# Patient Record
Sex: FEMALE | Race: WHITE
[De-identification: names, ages, dates, MRNs, and addresses within clinical notes are randomized per-mention and may not be internally consistent; named-entity substitution may affect disease eponyms.]

---

## 2022-02-16 ENCOUNTER — APPOINTMENT (OUTPATIENT)
Dept: HEART AND VASCULAR | Facility: CLINIC | Age: 52
End: 2022-02-16
Payer: COMMERCIAL

## 2022-02-16 ENCOUNTER — NON-APPOINTMENT (OUTPATIENT)
Age: 52
End: 2022-02-16

## 2022-02-16 ENCOUNTER — TRANSCRIPTION ENCOUNTER (OUTPATIENT)
Age: 52
End: 2022-02-16

## 2022-02-16 VITALS
DIASTOLIC BLOOD PRESSURE: 90 MMHG | WEIGHT: 200 LBS | BODY MASS INDEX: 30.31 KG/M2 | HEART RATE: 93 BPM | HEIGHT: 68 IN | SYSTOLIC BLOOD PRESSURE: 155 MMHG

## 2022-02-16 PROCEDURE — 93000 ELECTROCARDIOGRAM COMPLETE: CPT

## 2022-02-16 PROCEDURE — 99204 OFFICE O/P NEW MOD 45 MIN: CPT

## 2022-02-16 PROCEDURE — ZZZZZ: CPT

## 2022-02-16 PROCEDURE — 93306 TTE W/DOPPLER COMPLETE: CPT

## 2022-02-16 NOTE — REVIEW OF SYSTEMS
[Headache] : headache [Weight Gain (___ Lbs)] : [unfilled] ~Ulb weight gain [Dyspnea on exertion] : dyspnea during exertion [Palpitations] : palpitations [Negative] : Musculoskeletal

## 2022-02-21 NOTE — PHYSICAL EXAM
[Normal] : normal [Normal S1] : normal S1 [Rhythm Regular] : regular [Normal S2] : normal S2 [II] : a grade 2 [S3] : no S3 [S4] : no S4

## 2022-02-21 NOTE — ASSESSMENT
[FreeTextEntry1] : Impression \par HTN with arrhythmia\par will start w BB toprol 25 xl qd \par Echo Lvef 55% 1-2 MR 1+ ,AI \par Ekg No LVH no delta wave \par Will rec Holter or MCT if arrhythmia recur

## 2022-02-21 NOTE — HISTORY OF PRESENT ILLNESS
[FreeTextEntry1] : Patient is a 51-year-old perimenopausal female who has a prior history of gestational toxemia and gestational diabetes who in the past has had fluctuating blood pressures.  Patient was seen by her internist recently for an episode of sustained palpitations lasting about 20 minutes associated with moderate degree of shortness of breath.  She has noted periodic headaches which have increased in intensity over the last number of weeks and is here for cardiac evaluation.  Her last menstrual period was approximately 1 year ago

## 2022-03-02 ENCOUNTER — APPOINTMENT (OUTPATIENT)
Dept: HEART AND VASCULAR | Facility: CLINIC | Age: 52
End: 2022-03-02
Payer: COMMERCIAL

## 2022-03-02 VITALS
SYSTOLIC BLOOD PRESSURE: 130 MMHG | HEIGHT: 68 IN | WEIGHT: 200 LBS | DIASTOLIC BLOOD PRESSURE: 90 MMHG | HEART RATE: 94 BPM | BODY MASS INDEX: 30.31 KG/M2

## 2022-03-02 PROCEDURE — 93015 CV STRESS TEST SUPVJ I&R: CPT

## 2022-03-28 ENCOUNTER — NON-APPOINTMENT (OUTPATIENT)
Age: 52
End: 2022-03-28

## 2022-03-28 ENCOUNTER — APPOINTMENT (OUTPATIENT)
Dept: HEART AND VASCULAR | Facility: CLINIC | Age: 52
End: 2022-03-28
Payer: COMMERCIAL

## 2022-03-28 VITALS
SYSTOLIC BLOOD PRESSURE: 138 MMHG | DIASTOLIC BLOOD PRESSURE: 84 MMHG | HEART RATE: 59 BPM | WEIGHT: 210 LBS | BODY MASS INDEX: 31.83 KG/M2 | HEIGHT: 68 IN

## 2022-03-28 PROCEDURE — 99213 OFFICE O/P EST LOW 20 MIN: CPT

## 2022-03-28 NOTE — HISTORY OF PRESENT ILLNESS
[FreeTextEntry1] : Gained 10 lbs on BB\par less arrhythmia and lower BP noted \par ETT neg for ischemic abnl at 9 min

## 2022-03-28 NOTE — REVIEW OF SYSTEMS
[Palpitations] : palpitations [Negative] : Gastrointestinal [Headache] : no headache [Feeling Fatigued] : not feeling fatigued [Earache] : no earache [Sore Throat] : no sore throat

## 2022-06-13 ENCOUNTER — APPOINTMENT (OUTPATIENT)
Dept: HEART AND VASCULAR | Facility: CLINIC | Age: 52
End: 2022-06-13

## 2022-09-12 ENCOUNTER — NON-APPOINTMENT (OUTPATIENT)
Age: 52
End: 2022-09-12

## 2022-09-12 ENCOUNTER — APPOINTMENT (OUTPATIENT)
Dept: HEART AND VASCULAR | Facility: CLINIC | Age: 52
End: 2022-09-12

## 2022-09-12 VITALS
HEART RATE: 62 BPM | HEIGHT: 68 IN | BODY MASS INDEX: 31.52 KG/M2 | WEIGHT: 208 LBS | SYSTOLIC BLOOD PRESSURE: 137 MMHG | DIASTOLIC BLOOD PRESSURE: 83 MMHG

## 2022-09-12 PROCEDURE — 93000 ELECTROCARDIOGRAM COMPLETE: CPT

## 2022-09-12 PROCEDURE — 99214 OFFICE O/P EST MOD 30 MIN: CPT | Mod: 25

## 2022-09-14 NOTE — REASON FOR VISIT
[Symptom and Test Evaluation] : symptom and test evaluation [Arrhythmia/ECG Abnorrmalities] : arrhythmia/ECG abnormalities [FreeTextEntry1] : One episode of Rapid  bpm \par resolved after 1 hr \par had no dizziness or chest pain

## 2022-09-14 NOTE — DISCUSSION/SUMMARY
[Sick Sinus Syndrome] : sick sinus syndrome [Continue] : continuing beta blockers [de-identified] : can use extra dose of BB  [EKG obtained to assist in diagnosis and management of assessed problem(s)] : EKG obtained to assist in diagnosis and management of assessed problem(s)

## 2023-03-14 ENCOUNTER — APPOINTMENT (OUTPATIENT)
Dept: HEART AND VASCULAR | Facility: CLINIC | Age: 53
End: 2023-03-14
Payer: COMMERCIAL

## 2023-03-14 ENCOUNTER — NON-APPOINTMENT (OUTPATIENT)
Age: 53
End: 2023-03-14

## 2023-03-14 VITALS
HEART RATE: 58 BPM | SYSTOLIC BLOOD PRESSURE: 125 MMHG | BODY MASS INDEX: 29.86 KG/M2 | WEIGHT: 197 LBS | HEIGHT: 68 IN | DIASTOLIC BLOOD PRESSURE: 82 MMHG

## 2023-03-14 DIAGNOSIS — I34.0 NONRHEUMATIC MITRAL (VALVE) INSUFFICIENCY: ICD-10-CM

## 2023-03-14 PROCEDURE — 93306 TTE W/DOPPLER COMPLETE: CPT

## 2023-03-14 PROCEDURE — 93000 ELECTROCARDIOGRAM COMPLETE: CPT

## 2023-03-14 PROCEDURE — 99214 OFFICE O/P EST MOD 30 MIN: CPT | Mod: 25

## 2023-03-14 NOTE — HISTORY OF PRESENT ILLNESS
[FreeTextEntry1] : 09/12/22\par One episode of Rapid  bpm \par resolved after 1 hr \par had no dizziness or chest pain \par 03/14/23\par Denies Chest Pain, SOB, Dizziness, Leg edema, Orthopnea, PND, Palpitations, Syncope, FERRER, Diaphoresis.\par

## 2023-03-14 NOTE — ADDENDUM
[FreeTextEntry1] : I, Frankie Pineda, assisted in documentation on 03/14/2023 acting as a scribe for Dr. David Guajardo.\par \par

## 2023-03-14 NOTE — ASSESSMENT
[FreeTextEntry1] : Cardiac arrhythmia (427.9) (I49.9)\par Benign essential HTN (401.1) (I10)\par \par Mitral regurg y. \par Echo Lvef 55% 2+ MR (progressive) MILD AI (stable)

## 2023-03-14 NOTE — DISCUSSION/SUMMARY
[EKG obtained to assist in diagnosis and management of assessed problem(s)] : EKG obtained to assist in diagnosis and management of assessed problem(s) [Hypertension] : hypertension [Stable] : stable [Home Monitor] : a home monitor [Weight Loss] : weight loss [Low Sodium Diet] : low sodium diet [NSAIDs Avoidance] : non-steroidal anti-inflammatory drugs avoidance [Moderate Mitral Regurgitation] : moderate mitral regurgitation [Compensated] : compensated [Echocardiogram] : echocardiogram [None] : There are no changes in medication management [Patient] : the patient [de-identified] : Natural history discussed  [FreeTextEntry1] : \par \par

## 2023-06-05 ENCOUNTER — RX RENEWAL (OUTPATIENT)
Age: 53
End: 2023-06-05

## 2023-06-19 ENCOUNTER — NON-APPOINTMENT (OUTPATIENT)
Age: 53
End: 2023-06-19

## 2023-06-19 ENCOUNTER — APPOINTMENT (OUTPATIENT)
Dept: HEART AND VASCULAR | Facility: CLINIC | Age: 53
End: 2023-06-19
Payer: COMMERCIAL

## 2023-06-19 VITALS
WEIGHT: 193 LBS | HEIGHT: 68 IN | BODY MASS INDEX: 29.25 KG/M2 | HEART RATE: 82 BPM | DIASTOLIC BLOOD PRESSURE: 72 MMHG | SYSTOLIC BLOOD PRESSURE: 132 MMHG

## 2023-06-19 PROCEDURE — 99215 OFFICE O/P EST HI 40 MIN: CPT | Mod: 25

## 2023-06-19 PROCEDURE — 93000 ELECTROCARDIOGRAM COMPLETE: CPT

## 2023-06-19 NOTE — DISCUSSION/SUMMARY
[EKG obtained to assist in diagnosis and management of assessed problem(s)] : EKG obtained to assist in diagnosis and management of assessed problem(s) [Paroxysmal Atrial Fibrillation] : paroxysmal atrial fibrillation [Rapid Ventricular Response] : rapid ventricular response [Deteriorating] : deteriorating [Rate Control] : rate control [Anticoagulation] : anticoagulation [Continuous Antiarrhythmics] : continuous antiarrhythmics [Increase] : increasing beta blockers [Begin] : beginning anticoagulants [Catheter Ablation Arrhythmogenic Foci] : catheter ablation of the arrhythmogenic foci [Patient] : the patient [Family] : the patient's family [FreeTextEntry1] : Hypertension: The impression is hypertension. Currently, the condition is stable. There are no changes in medication management. Other planned treatment includes a home monitor, weight loss, low sodium diet and non-steroidal anti-inflammatory drugs avoidance. The plan was discussed with the patient. \par Mitral Regurgitation: The impression is moderate mitral regurgitation. Currently, the condition is compensated. The diagnostic plan includes echocardiogram.There are no changes in medication management. Other planned treatment includes Natural history discussed. The plan was discussed with the patient. \par \par Patient is a 52-year-old female who had been seen here prior a year ago for periodic palpitations stress test and echo at that time were within normal limits with the exception of some mild to moderate mitral regurgitation.  Patient has had acceleration of her symptoms with frequent ER visits over the last week with heart rates up to 180 with profound symptoms of palpitations weakness fatigue albeit no syncope.  She was given in the emergency room last night for which she got some IV Cardizem which helped convert her back into sinus rhythm.  We discussed plan for her which would be initiation of anticoagulation during this period of paroxysmal atrial fibs/flutter the addition of a higher dose of beta-blocker as well as Multaq as an antiarrhythmic.  Referral was also given for electrophysiology evaluation for potential ablation procedure its benefits and risks were outlined during the visit with the patient's \par .

## 2023-06-19 NOTE — ASSESSMENT
[FreeTextEntry1] : Cardiac arrhythmia (427.9) (I49.9)\par Benign essential HTN (401.1) (I10)\par \par Mitral regurg y. \par \par

## 2023-06-19 NOTE — ADDENDUM
[FreeTextEntry1] : I, Frankie Pineda, assisted in documentation on 06/19/2023 acting as a scribe for Dr. David Guajardo.\par \par

## 2023-06-19 NOTE — HISTORY OF PRESENT ILLNESS
[FreeTextEntry1] : 09/12/22\par One episode of Rapid  bpm \par resolved after 1 hr \par had no dizziness or chest pain \par 03/14/23\par Denies Chest Pain, SOB, Dizziness, Leg edema, Orthopnea, PND, Palpitations, Syncope, FERRER, Diaphoresis.\par 06/19/23\par has had recurrent visit for arrhythmias and noted to be in aflutter w rapid rates \par Seen In ER today with AFLUTTER HR of 200 highly symptomatic with rapid HR

## 2023-06-21 ENCOUNTER — NON-APPOINTMENT (OUTPATIENT)
Age: 53
End: 2023-06-21

## 2023-06-21 ENCOUNTER — APPOINTMENT (OUTPATIENT)
Dept: HEART AND VASCULAR | Facility: CLINIC | Age: 53
End: 2023-06-21
Payer: COMMERCIAL

## 2023-06-21 VITALS
BODY MASS INDEX: 29.1 KG/M2 | DIASTOLIC BLOOD PRESSURE: 76 MMHG | HEART RATE: 70 BPM | SYSTOLIC BLOOD PRESSURE: 110 MMHG | TEMPERATURE: 96 F | WEIGHT: 192 LBS | HEIGHT: 68 IN | OXYGEN SATURATION: 100 %

## 2023-06-21 DIAGNOSIS — Z82.49 FAMILY HISTORY OF ISCHEMIC HEART DISEASE AND OTHER DISEASES OF THE CIRCULATORY SYSTEM: ICD-10-CM

## 2023-06-21 DIAGNOSIS — Z78.9 OTHER SPECIFIED HEALTH STATUS: ICD-10-CM

## 2023-06-21 PROCEDURE — 93000 ELECTROCARDIOGRAM COMPLETE: CPT

## 2023-06-21 PROCEDURE — 99204 OFFICE O/P NEW MOD 45 MIN: CPT | Mod: 25

## 2023-06-21 PROCEDURE — 99214 OFFICE O/P EST MOD 30 MIN: CPT | Mod: 25

## 2023-06-22 NOTE — PHYSICAL EXAM
[Well Developed] : well developed [Well Nourished] : well nourished [No Acute Distress] : no acute distress [Normal Conjunctiva] : normal conjunctiva [5th Left ICS - MCL] : palpated at the 5th LICS in the midclavicular line [Normal Rate] : normal [Rhythm Regular] : regular [Normal S1] : normal S1 [Normal S2] : normal S2 [Clear Lung Fields] : clear lung fields [Good Air Entry] : good air entry [No Respiratory Distress] : no respiratory distress  [Normal Gait] : normal gait [No Edema] : no edema [No Rash] : no rash [Moves all extremities] : moves all extremities [Alert and Oriented] : alert and oriented

## 2023-06-27 NOTE — DISCUSSION/SUMMARY
[EKG obtained to assist in diagnosis and management of assessed problem(s)] : EKG obtained to assist in diagnosis and management of assessed problem(s) [FreeTextEntry1] : 52 year old female with newly diagnosed atrial fibrillation / flutter, who presents for initial evaluation. We discussed in detail the normal conduction system of the heart and what atrial flutter and fibrillation is.  We discussed the natural progression of this arrhythmia in the context of any existing comorbidities.  We also discussed the association between atrial fibrillation and thrombotic events / stroke.  Her CHADS score is at least 1  and she is currently on oral anticoagulation.  We discussed lifestyle modifications including weight control, treatment of HTN, diabetes, and limiting alcohol.  We discussed the association between sleep apnea and if they in fact have sleep apnea the importance of treating this regardless of the treatment plan.\par \par We discussed treatment options for paroxysmal atrial fibrillation including a rate control strategy vs. rhythm control with antiarrhythmic medications or an ablation.  The patent was counseled on the fact that there is no cure for atrial fibrillation and that for long term control of atrial fibrillation a combination of strategies if often used.  Regardless of treatment options and maintenance of sinus rhythm, anticoagulation is still recommended based on CHADSVASC score.  \par \par Success in rhythm control management is best defined as improved quality of life, maintenance of sinus rhythm and reduced hospitalization since not all patients have symptoms to diagnose “sub-clinical episodes”.  We discussed that an ablation results in better long-term outcomes compared to medical therapy alone but repeat procedures and/or addition of medications may be required even after an ablation.  Results of ablation are better for paroxysmal patients compared to persistent patients, which are better than long-standing persistent patients.  Given that the patient is in paroxysmal atrial fibrillation currently we discussed that typical 3-5 year success rates (freedom from clinical atrial fibrillation) based on current literature was quoted to the patient at approximately 80%.\par \par After discussing this the patient would like to proceed with an ablation.  We discussed the procedure in detail including risks, benefits, and drawbacks of each option in detail.   We discussed the procedure in detail including risks, benefits, and alternatives.  I have quoted him a 1:700 chance of major complication related to the procedure.  Risks including, but not limited to; infection, anesthesia reaction, bleeding, pain, vascular injury, cardiac perforation, esophageal injury/fistula,  TE/CVA, phrenic nerve injury, arrhythmia recurrence, need for emergent surgery and death were discussed.  We also discussed that having recurrent arrhythmia for the first 90 days post ablation is not predictive of long-term success of the ablation.  \par \par Ms Morales appeared to understand the whole discussion and verbalized that all his questions were answered to his satisfaction.  He was given pre procedure instructions and knows to call with any questions or concerns.  \par \par During this visit we reviewed outside medical records including event monitor, echo, MRI, office notes and care plan was discussed with patient / family as well as referring doctor.\par

## 2023-06-27 NOTE — REVIEW OF SYSTEMS
[Palpitations] : palpitations [Under Stress] : under stress [Negative] : Heme/Lymph [Fever] : no fever [Chills] : no chills [SOB] : no shortness of breath [Dyspnea on exertion] : not dyspnea during exertion [Chest Discomfort] : no chest discomfort [Syncope] : no syncope

## 2023-06-27 NOTE — ADDENDUM
[FreeTextEntry1] : I, Irwin King, am scribing for and the presence of Dr. Good the following sections: HPI, PMH,Family/social history, ROS, Physical Exam, Assessment / Plan.\par \par  INando, personally performed the services described in the documentation, reviewed the documentation recorded by the scribe in my presence and it accurately and completely records my words and actions.\par

## 2023-07-05 ENCOUNTER — APPOINTMENT (OUTPATIENT)
Dept: HEART AND VASCULAR | Facility: CLINIC | Age: 53
End: 2023-07-05
Payer: COMMERCIAL

## 2023-07-05 ENCOUNTER — NON-APPOINTMENT (OUTPATIENT)
Age: 53
End: 2023-07-05

## 2023-07-05 VITALS
BODY MASS INDEX: 29.55 KG/M2 | WEIGHT: 195 LBS | HEIGHT: 68 IN | DIASTOLIC BLOOD PRESSURE: 80 MMHG | HEART RATE: 56 BPM | SYSTOLIC BLOOD PRESSURE: 110 MMHG

## 2023-07-05 DIAGNOSIS — I10 ESSENTIAL (PRIMARY) HYPERTENSION: ICD-10-CM

## 2023-07-05 DIAGNOSIS — I49.9 CARDIAC ARRHYTHMIA, UNSPECIFIED: ICD-10-CM

## 2023-07-05 PROCEDURE — 99214 OFFICE O/P EST MOD 30 MIN: CPT | Mod: 25

## 2023-07-05 PROCEDURE — 93000 ELECTROCARDIOGRAM COMPLETE: CPT

## 2023-07-05 NOTE — ADDENDUM
[FreeTextEntry1] : I, Frankie Pineda, assisted in documentation on 07/05/2023 acting as a scribe for Dr. aDvid Guajardo.\par \par

## 2023-07-05 NOTE — HISTORY OF PRESENT ILLNESS
[FreeTextEntry1] : 09/12/22\par One episode of Rapid  bpm \par resolved after 1 hr \par had no dizziness or chest pain \par 03/14/23\par Denies Chest Pain, SOB, Dizziness, Leg edema, Orthopnea, PND, Palpitations, Syncope, FERRER, Diaphoresis.\par 06/19/23\par has had recurrent visit for arrhythmias and noted to be in aflutter w rapid rates \par Seen In ER today with AFLUTTER HR of 200 highly symptomatic with rapid HR \par 07/05/23\par seen by EP for ablation \par Hads one break through of AFIB asting 2 hrs \par resolved w extra BB \par planned for ABLATION on 7/17/23

## 2023-07-05 NOTE — DISCUSSION/SUMMARY
[EKG obtained to assist in diagnosis and management of assessed problem(s)] : EKG obtained to assist in diagnosis and management of assessed problem(s) [Paroxysmal Atrial Fibrillation] : paroxysmal atrial fibrillation [Deteriorating] : deteriorating [Rate Control] : rate control [Rhythm Control] : rhythm control [Anticoagulation] : anticoagulation [Catheter Ablation Arrhythmogenic Foci] : catheter ablation of the arrhythmogenic foci [Patient] : the patient

## 2023-07-17 ENCOUNTER — INPATIENT (INPATIENT)
Facility: HOSPITAL | Age: 53
LOS: 0 days | Discharge: HOME | DRG: 274 | End: 2023-07-18
Attending: INTERNAL MEDICINE | Admitting: INTERNAL MEDICINE
Payer: COMMERCIAL

## 2023-07-17 VITALS — WEIGHT: 192.02 LBS | HEIGHT: 68 IN

## 2023-07-17 LAB
ANION GAP SERPL CALC-SCNC: 13 MMOL/L — SIGNIFICANT CHANGE UP (ref 5–17)
APTT BLD: 35.4 SEC — SIGNIFICANT CHANGE UP (ref 27.5–35.5)
BLD GP AB SCN SERPL QL: NEGATIVE — SIGNIFICANT CHANGE UP
BUN SERPL-MCNC: 21 MG/DL — SIGNIFICANT CHANGE UP (ref 7–23)
CALCIUM SERPL-MCNC: 9.1 MG/DL — SIGNIFICANT CHANGE UP (ref 8.4–10.5)
CHLORIDE SERPL-SCNC: 104 MMOL/L — SIGNIFICANT CHANGE UP (ref 96–108)
CO2 SERPL-SCNC: 26 MMOL/L — SIGNIFICANT CHANGE UP (ref 22–31)
CREAT SERPL-MCNC: 0.84 MG/DL — SIGNIFICANT CHANGE UP (ref 0.5–1.3)
EGFR: 84 ML/MIN/1.73M2 — SIGNIFICANT CHANGE UP
GLUCOSE SERPL-MCNC: 93 MG/DL — SIGNIFICANT CHANGE UP (ref 70–99)
HCT VFR BLD CALC: 40 % — SIGNIFICANT CHANGE UP (ref 34.5–45)
HGB BLD-MCNC: 13.6 G/DL — SIGNIFICANT CHANGE UP (ref 11.5–15.5)
INR BLD: 1.15 — SIGNIFICANT CHANGE UP (ref 0.88–1.16)
ISTAT INR: 1.1 — SIGNIFICANT CHANGE UP (ref 0.88–1.16)
ISTAT PT: 12.6 SEC — SIGNIFICANT CHANGE UP (ref 10–12.9)
ISTAT VENOUS BE: 1 MMOL/L — SIGNIFICANT CHANGE UP (ref -2–3)
ISTAT VENOUS GLUCOSE: 92 MG/DL — SIGNIFICANT CHANGE UP (ref 70–99)
ISTAT VENOUS HCO3: 26 MMOL/L — SIGNIFICANT CHANGE UP (ref 23–28)
ISTAT VENOUS HEMATOCRIT: 39 % — SIGNIFICANT CHANGE UP (ref 34.5–45)
ISTAT VENOUS HEMOGLOBIN: 13.3 GM/DL — SIGNIFICANT CHANGE UP (ref 11.5–15.5)
ISTAT VENOUS IONIZED CALCIUM: 1.17 MMOL/L — SIGNIFICANT CHANGE UP (ref 1.12–1.3)
ISTAT VENOUS PCO2: 45 MMHG — SIGNIFICANT CHANGE UP (ref 41–51)
ISTAT VENOUS PH: 7.37 — SIGNIFICANT CHANGE UP (ref 7.31–7.41)
ISTAT VENOUS PO2: <66 MMHG — LOW (ref 35–40)
ISTAT VENOUS POTASSIUM: 3.9 MMOL/L — SIGNIFICANT CHANGE UP (ref 3.5–5.3)
ISTAT VENOUS SO2: 46 % — SIGNIFICANT CHANGE UP
ISTAT VENOUS SODIUM: 141 MMOL/L — SIGNIFICANT CHANGE UP (ref 135–145)
ISTAT VENOUS TCO2: 28 MMOL/L — SIGNIFICANT CHANGE UP (ref 22–31)
MCHC RBC-ENTMCNC: 30 PG — SIGNIFICANT CHANGE UP (ref 27–34)
MCHC RBC-ENTMCNC: 34 GM/DL — SIGNIFICANT CHANGE UP (ref 32–36)
MCV RBC AUTO: 88.3 FL — SIGNIFICANT CHANGE UP (ref 80–100)
NRBC # BLD: 0 /100 WBCS — SIGNIFICANT CHANGE UP (ref 0–0)
PLATELET # BLD AUTO: 257 K/UL — SIGNIFICANT CHANGE UP (ref 150–400)
POCT ISTAT CREATININE: 0.8 MG/DL — SIGNIFICANT CHANGE UP (ref 0.5–1.3)
POTASSIUM SERPL-MCNC: 3.9 MMOL/L — SIGNIFICANT CHANGE UP (ref 3.5–5.3)
POTASSIUM SERPL-SCNC: 3.9 MMOL/L — SIGNIFICANT CHANGE UP (ref 3.5–5.3)
PROTHROM AB SERPL-ACNC: 13.7 SEC — HIGH (ref 10.5–13.4)
RBC # BLD: 4.53 M/UL — SIGNIFICANT CHANGE UP (ref 3.8–5.2)
RBC # FLD: 13 % — SIGNIFICANT CHANGE UP (ref 10.3–14.5)
RH IG SCN BLD-IMP: POSITIVE — SIGNIFICANT CHANGE UP
SODIUM SERPL-SCNC: 143 MMOL/L — SIGNIFICANT CHANGE UP (ref 135–145)
WBC # BLD: 7.67 K/UL — SIGNIFICANT CHANGE UP (ref 3.8–10.5)
WBC # FLD AUTO: 7.67 K/UL — SIGNIFICANT CHANGE UP (ref 3.8–10.5)

## 2023-07-17 PROCEDURE — 93655 ICAR CATH ABLTJ DSCRT ARRHYT: CPT

## 2023-07-17 PROCEDURE — 99223 1ST HOSP IP/OBS HIGH 75: CPT

## 2023-07-17 PROCEDURE — 93656 COMPRE EP EVAL ABLTJ ATR FIB: CPT

## 2023-07-17 PROCEDURE — 93010 ELECTROCARDIOGRAM REPORT: CPT

## 2023-07-17 RX ORDER — DRONEDARONE 400 MG/1
400 TABLET, FILM COATED ORAL
Refills: 0 | Status: DISCONTINUED | OUTPATIENT
Start: 2023-07-17 | End: 2023-07-18

## 2023-07-17 RX ORDER — APIXABAN 2.5 MG/1
5 TABLET, FILM COATED ORAL EVERY 12 HOURS
Refills: 0 | Status: DISCONTINUED | OUTPATIENT
Start: 2023-07-18 | End: 2023-07-18

## 2023-07-17 RX ORDER — METOPROLOL TARTRATE 50 MG
50 TABLET ORAL DAILY
Refills: 0 | Status: DISCONTINUED | OUTPATIENT
Start: 2023-07-17 | End: 2023-07-18

## 2023-07-17 RX ADMIN — Medication 50 MILLIGRAM(S): at 23:17

## 2023-07-17 RX ADMIN — DRONEDARONE 400 MILLIGRAM(S): 400 TABLET, FILM COATED ORAL at 23:18

## 2023-07-17 NOTE — PRE-ANESTHESIA EVALUATION ADULT - NSANTHPMHFT_GEN_ALL_CORE
53yo F with Afib/flutter for ablation.   METS>4.  Echo with normal biV function, mild MR/AI.   Anticoagulated on elliquis.     PSH:  Knee surgery, D&C, Endometrial Ablation, Cholecystectomy, Leg Skin Grafting   *No prior reactions to anesthesia

## 2023-07-17 NOTE — PATIENT PROFILE ADULT - FALL HARM RISK - HARM RISK INTERVENTIONS

## 2023-07-17 NOTE — H&P ADULT - HISTORY OF PRESENT ILLNESS
52 year old female with newly diagnosed atrial fibrillation / flutter, who presents for atypical aflutter ablation.    She states a couple of weeks ago her father became very ill and passed away which was very stressful. She wasn’t feeling well and while walking became lightheaded. She was found to be in atrial flutter and self converted. She had 2 other episodes like this and with each one went back into NSR on her own after getting fluids. The second episode she was told she was in afib. She states she has had her thyroid checked in past few months and was WNL. No chest pain, syncope, orthopnea, PND, FERRER. She is now on Metoprolol, eliquis and multaq.  Her CHADS score is at least 1.        Echo 3/2023- EF 60%. normal atria. mod MR. mild KS/TR/AR      normal exercise stress test.      Cardiology Summary   EC23 NSR at 66bpm      PHYSICAL EXAM  Constitutional:  well developed, well nourished, no acute distress.      Eyes:  normal conjunctiva.      Cardiac:  Cardiovascular: The PMI was palpated at the 5th LICS in the midclavicular line. The heart rate was normal. The rhythm was regular. Heart sounds: normal S1, normal S2.      Pulmonary:  clear lung fields, good air entry, no respiratory distress.      Musculoskeletal:  normal gait.      Extremities:  no edema.      Skin:  no rash.      Neurological:  moves all extremities.      Psychiatric:  alert and oriented.

## 2023-07-17 NOTE — H&P ADULT - ASSESSMENT
51 yo F with newly diagnosed atrial fibrillation / flutter, who presents for atypical aflutter ablation.    - Full set of labs  - groin site prep  - EKG  - bilateral groin check post procedure  - Continue metoprolol and multaq  - resume eliquis 6 hrs post hemostasis

## 2023-07-18 ENCOUNTER — TRANSCRIPTION ENCOUNTER (OUTPATIENT)
Age: 53
End: 2023-07-18

## 2023-07-18 VITALS
HEART RATE: 87 BPM | OXYGEN SATURATION: 95 % | DIASTOLIC BLOOD PRESSURE: 72 MMHG | SYSTOLIC BLOOD PRESSURE: 109 MMHG | RESPIRATION RATE: 18 BRPM

## 2023-07-18 PROCEDURE — 85347 COAGULATION TIME ACTIVATED: CPT

## 2023-07-18 PROCEDURE — 93656 COMPRE EP EVAL ABLTJ ATR FIB: CPT

## 2023-07-18 PROCEDURE — C1769: CPT

## 2023-07-18 PROCEDURE — C1760: CPT

## 2023-07-18 PROCEDURE — 84132 ASSAY OF SERUM POTASSIUM: CPT

## 2023-07-18 PROCEDURE — C1766: CPT

## 2023-07-18 PROCEDURE — 82803 BLOOD GASES ANY COMBINATION: CPT

## 2023-07-18 PROCEDURE — 99239 HOSP IP/OBS DSCHRG MGMT >30: CPT

## 2023-07-18 PROCEDURE — 86850 RBC ANTIBODY SCREEN: CPT

## 2023-07-18 PROCEDURE — C1732: CPT

## 2023-07-18 PROCEDURE — 82330 ASSAY OF CALCIUM: CPT

## 2023-07-18 PROCEDURE — 82565 ASSAY OF CREATININE: CPT

## 2023-07-18 PROCEDURE — 85014 HEMATOCRIT: CPT

## 2023-07-18 PROCEDURE — 85730 THROMBOPLASTIN TIME PARTIAL: CPT

## 2023-07-18 PROCEDURE — 93005 ELECTROCARDIOGRAM TRACING: CPT

## 2023-07-18 PROCEDURE — 84295 ASSAY OF SERUM SODIUM: CPT

## 2023-07-18 PROCEDURE — 36415 COLL VENOUS BLD VENIPUNCTURE: CPT

## 2023-07-18 PROCEDURE — C1894: CPT

## 2023-07-18 PROCEDURE — 86901 BLOOD TYPING SEROLOGIC RH(D): CPT

## 2023-07-18 PROCEDURE — 80048 BASIC METABOLIC PNL TOTAL CA: CPT

## 2023-07-18 PROCEDURE — 86900 BLOOD TYPING SEROLOGIC ABO: CPT

## 2023-07-18 PROCEDURE — 93655 ICAR CATH ABLTJ DSCRT ARRHYT: CPT

## 2023-07-18 PROCEDURE — 85610 PROTHROMBIN TIME: CPT

## 2023-07-18 PROCEDURE — 82947 ASSAY GLUCOSE BLOOD QUANT: CPT

## 2023-07-18 PROCEDURE — 85027 COMPLETE CBC AUTOMATED: CPT

## 2023-07-18 PROCEDURE — C1759: CPT

## 2023-07-18 RX ORDER — COLCHICINE 0.6 MG
1 TABLET ORAL
Qty: 7 | Refills: 0
Start: 2023-07-18 | End: 2023-07-24

## 2023-07-18 RX ADMIN — Medication 50 MILLIGRAM(S): at 07:00

## 2023-07-18 RX ADMIN — APIXABAN 5 MILLIGRAM(S): 2.5 TABLET, FILM COATED ORAL at 00:10

## 2023-07-18 RX ADMIN — DRONEDARONE 400 MILLIGRAM(S): 400 TABLET, FILM COATED ORAL at 07:00

## 2023-07-18 RX ADMIN — APIXABAN 5 MILLIGRAM(S): 2.5 TABLET, FILM COATED ORAL at 11:25

## 2023-07-18 NOTE — DISCHARGE NOTE NURSING/CASE MANAGEMENT/SOCIAL WORK - PATIENT PORTAL LINK FT
You can access the FollowMyHealth Patient Portal offered by [facility] by registering at the following website: [email or web address]. By joining [employer]’s FollowMyHealth portal, you will also be able to view your health information using other applications (apps) compatible with our system.

## 2023-07-18 NOTE — DISCHARGE NOTE PROVIDER - CARE PROVIDER_API CALL
Nando Good  Cardiac Electrophysiology  [address], [address] [place] [address]  Phone: [phone]  Fax: [phone]  Follow Up Time:

## 2023-07-18 NOTE — DISCHARGE NOTE PROVIDER - NSDCCPCAREPLAN_GEN_ALL_CORE_FT
PRINCIPAL DISCHARGE DIAGNOSIS  Diagnosis: Atrial fibrillation and flutter  Assessment and Plan of Treatment:

## 2023-07-18 NOTE — DISCHARGE NOTE NURSING/CASE MANAGEMENT/SOCIAL WORK - NSDCPEFALRISK_GEN_ALL_CORE
For information on Fall & Injury Prevention, visit: [email or web address] OR  [email or web address] OR  https://www.cdc.gov/steadi/patient.html

## 2023-07-18 NOTE — DISCHARGE NOTE PROVIDER - NSDCMRMEDTOKEN_GEN_ALL_CORE_FT
colchicine 0.6 mg oral tablet: 1 tab(s) orally once a day  Eliquis 5 mg oral tablet: orally 2 times a day  Metoprolol Succinate ER 50 mg oral tablet, extended release: 1 orally once a day  pantoprazole 40 mg oral delayed release tablet: 1 orally once a day

## 2023-07-18 NOTE — DISCHARGE NOTE PROVIDER - NSDCFUADDINST_GEN_ALL_CORE_FT
You had an ablation for atrial flutter 7/17/23 with Dr. Good.      For the next 7 days, it is very important that you avoid any strenuous activities and heavy lifting (more than 5 pounds). Please avoid activities that may increase pressure to your groins, such as repetitive hip flexion (squats), sex, prolonged sitting and standing, and straining on toilet.      Please monitor your groins for any active bleeding, swelling and signs of infection such as redness and drainage. Please call our office at [phone] if you experience any of these symptoms or if you are having chest pain, discomfort or palpitations.      You may shower normally tomorrow. Please avoid scrubbing at the groin sites to avoid rebleeding complications. Do not cover puncture sites with any dressing, ointments, creams, lotions or powders.       Please continue to take your medications as prescribed.      One new prescription has been sent to the [facility] pharmacy downstairs. This medication is colchicine (an anti-inflammatory agent to help with post-procedure inflammation discomfort) x 7days. Please continue to take pantoprazole (stomach acid reducer) x 1 month.     If you have any questions or concerns about any of the discharge instructions please call our office at [phone].

## 2023-07-18 NOTE — DISCHARGE NOTE PROVIDER - HOSPITAL COURSE
Ms. Morales is a 52 year old female with newly diagnosed atrial fibrillation / flutter, who presented on 7/17/23 ablation of atypical aflutter.    Procedure was uncomplicated and pt tolerated procedure well. Telemetry from overnight reviewed and uneventful. Bilateral groin sites assessed and found to be free of hematoma, bleeding and swelling. She will continue all medications as prescribed. Two new medications prescribed are: Pantoprazole and colchicine. She demonstrates a good understanding of discharge instructions. She has a follow up appointment with Dr. Good.

## 2023-07-18 NOTE — DISCHARGE NOTE NURSING/CASE MANAGEMENT/SOCIAL WORK - NSDCPEPTCAREGIVEDUMATLIST _GEN_ALL_CORE
Nick 45 Transitions Initial Follow Up Call    Outreach made within 2 business days of discharge: Yes    Patient: Marky Guerrier Patient : 1992   MRN: [id number]  Reason for Admission: There are no discharge diagnoses documented for the most recent discharge. Discharge Date: 22       Spoke with: Patient    Discharge department/facility: [facility]    TCM Interactive Patient Contact:  Was patient able to fill all prescriptions: Yes  Was patient instructed to bring all medications to the follow-up visit: Yes  Is patient taking all medications as directed in the discharge summary?  Yes  Does patient understand their discharge instructions: Yes  Does patient have questions or concerns that need addressed prior to 7-14 day follow up office visit: no    Scheduled appointment with PCP within 7-14 days    Follow Up  Future Appointments   Date Time Provider Tigre Victor   10/5/2022  8:45 AM DO Nila Arguello [id number]       Jonas Hinojosa LPN Apixaban/Eliquis

## 2023-07-21 DIAGNOSIS — Z79.01 LONG TERM (CURRENT) USE OF ANTICOAGULANTS: ICD-10-CM

## 2023-07-21 DIAGNOSIS — I48.91 UNSPECIFIED ATRIAL FIBRILLATION: ICD-10-CM

## 2023-07-21 DIAGNOSIS — I48.4 ATYPICAL ATRIAL FLUTTER: ICD-10-CM

## 2023-07-25 LAB
ISTAT ACTK (ACTIVATED CLOTTING TIME KAOLIN): 311 SEC — HIGH (ref 74–137)
ISTAT ACTK (ACTIVATED CLOTTING TIME KAOLIN): 317 SEC — HIGH (ref 74–137)
ISTAT ACTK (ACTIVATED CLOTTING TIME KAOLIN): 329 SEC — HIGH (ref 74–137)
ISTAT ACTK (ACTIVATED CLOTTING TIME KAOLIN): 341 SEC — HIGH (ref 74–137)
ISTAT ACTK (ACTIVATED CLOTTING TIME KAOLIN): 359 SEC — HIGH (ref 74–137)
ISTAT ACTK (ACTIVATED CLOTTING TIME KAOLIN): 383 SEC — HIGH (ref 74–137)

## 2023-08-21 RX ORDER — APIXABAN 2.5 MG/1
1 TABLET, FILM COATED ORAL
Refills: 0 | DISCHARGE

## 2023-08-21 RX ORDER — DRONEDARONE 400 MG/1
1 TABLET, FILM COATED ORAL
Refills: 0 | DISCHARGE

## 2023-08-21 RX ORDER — PANTOPRAZOLE SODIUM 20 MG/1
1 TABLET, DELAYED RELEASE ORAL
Qty: 0 | Refills: 0 | DISCHARGE

## 2023-08-21 RX ORDER — METOPROLOL TARTRATE 50 MG
1 TABLET ORAL
Refills: 0 | DISCHARGE

## 2023-08-30 ENCOUNTER — NON-APPOINTMENT (OUTPATIENT)
Age: 53
End: 2023-08-30

## 2023-08-30 ENCOUNTER — APPOINTMENT (OUTPATIENT)
Dept: HEART AND VASCULAR | Facility: CLINIC | Age: 53
End: 2023-08-30
Payer: COMMERCIAL

## 2023-08-30 VITALS
OXYGEN SATURATION: 96 % | DIASTOLIC BLOOD PRESSURE: 72 MMHG | HEIGHT: 68 IN | HEART RATE: 99 BPM | SYSTOLIC BLOOD PRESSURE: 108 MMHG

## 2023-08-30 PROBLEM — I48.92 UNSPECIFIED ATRIAL FLUTTER: Chronic | Status: ACTIVE | Noted: 2023-07-17

## 2023-08-30 PROCEDURE — 99213 OFFICE O/P EST LOW 20 MIN: CPT | Mod: 25

## 2023-08-30 PROCEDURE — 93000 ELECTROCARDIOGRAM COMPLETE: CPT

## 2023-08-30 RX ORDER — DRONEDARONE 400 MG/1
400 TABLET, FILM COATED ORAL TWICE DAILY
Qty: 60 | Refills: 5 | Status: COMPLETED | COMMUNITY
Start: 2023-06-19 | End: 2023-08-30

## 2023-08-30 RX ORDER — PANTOPRAZOLE SODIUM 40 MG/1
40 TABLET, DELAYED RELEASE ORAL
Refills: 0 | Status: ACTIVE | COMMUNITY
Start: 2023-08-30

## 2023-09-06 ENCOUNTER — APPOINTMENT (OUTPATIENT)
Dept: HEART AND VASCULAR | Facility: CLINIC | Age: 53
End: 2023-09-06

## 2023-09-06 NOTE — HISTORY OF PRESENT ILLNESS
[FreeTextEntry1] : 53 year old female with esophageal ring?/GERD, and atrial fibrillation / flutter, who is now s/p ablation of atypical AFL (7/17/2023) and presents today for follow-up.   At the end of July she presented to the ER with palpitations. She checked her HR on her pulse oximeter and her HR was as high as 190 bpm. When she went to the ED she self-converted. She presents today in SR. She feels well post-ablation. Her groin access site healed well. Denies c/p, sob, LE edema and syncope.  Hx: She states that in June her father became very ill and passed away which was very stressful.  She wasn't feeling well and while walking became lightheaded.  She was found to be in atrial flutter and self converted.  She had 2 other episodes like this and with each one went back into NSR on her own after getting fluids.  The second episode she was told she was in afib.  She states she has had her thyroid checked in past few months and was WNL.  No chest pain, syncope, orthopnea, PND, FERRER.  She is now on Metoprolol, eliquis and multaq.    Echo 3/2023- EF 60%. normal atria. mod MR. mild OR/TR/AR 2022 normal exercise stress test.

## 2023-09-06 NOTE — END OF VISIT
[FreeTextEntry3] : I, Arabella Quevedo, am scribing for (in the presence of) Dr. Good the following sections: HPI, PMH,Family/social history, ROS, Physical Exam, Assessment / Plan.   I, Nando Good, personally performed the services described in the documentation, reviewed the documentation recorded by the scribe in my presence and it accurately and completely records my words and actions.

## 2023-09-06 NOTE — DISCUSSION/SUMMARY
[FreeTextEntry1] : 53 year old female with newly diagnosed atrial fibrillation / flutter, now s/p RFA of atypical AFL (7/17/2023) who had a brief recurrence during the blanking period. Unclear if it was AFL or AF.    I have asked her to continue Eliquis for stroke/TE prevention at this time. Discussed that recurrence was in the blanking period which lasts for three months after the procedure. We will continue to monitor for now. I have asked her to return in two months.

## 2023-09-20 ENCOUNTER — NON-APPOINTMENT (OUTPATIENT)
Age: 53
End: 2023-09-20

## 2023-09-20 ENCOUNTER — APPOINTMENT (OUTPATIENT)
Dept: HEART AND VASCULAR | Facility: CLINIC | Age: 53
End: 2023-09-20
Payer: COMMERCIAL

## 2023-09-20 VITALS
HEART RATE: 75 BPM | SYSTOLIC BLOOD PRESSURE: 128 MMHG | BODY MASS INDEX: 29.55 KG/M2 | WEIGHT: 195 LBS | HEIGHT: 68 IN | DIASTOLIC BLOOD PRESSURE: 86 MMHG

## 2023-09-20 DIAGNOSIS — Z00.00 ENCOUNTER FOR GENERAL ADULT MEDICAL EXAMINATION W/OUT ABNORMAL FINDINGS: ICD-10-CM

## 2023-09-20 PROCEDURE — 99214 OFFICE O/P EST MOD 30 MIN: CPT | Mod: 25

## 2023-09-20 PROCEDURE — 93000 ELECTROCARDIOGRAM COMPLETE: CPT

## 2023-09-20 RX ORDER — MELOXICAM 7.5 MG/1
7.5 TABLET ORAL
Qty: 10 | Refills: 3 | Status: ACTIVE | COMMUNITY
Start: 2023-09-20 | End: 1900-01-01

## 2023-10-18 ENCOUNTER — APPOINTMENT (OUTPATIENT)
Dept: HEART AND VASCULAR | Facility: CLINIC | Age: 53
End: 2023-10-18

## 2023-12-13 ENCOUNTER — NON-APPOINTMENT (OUTPATIENT)
Age: 53
End: 2023-12-13

## 2023-12-13 ENCOUNTER — APPOINTMENT (OUTPATIENT)
Dept: HEART AND VASCULAR | Facility: CLINIC | Age: 53
End: 2023-12-13
Payer: COMMERCIAL

## 2023-12-13 VITALS
DIASTOLIC BLOOD PRESSURE: 85 MMHG | SYSTOLIC BLOOD PRESSURE: 127 MMHG | BODY MASS INDEX: 29.55 KG/M2 | HEART RATE: 99 BPM | WEIGHT: 195 LBS | HEIGHT: 68 IN

## 2023-12-13 PROCEDURE — 93000 ELECTROCARDIOGRAM COMPLETE: CPT

## 2023-12-13 PROCEDURE — 99214 OFFICE O/P EST MOD 30 MIN: CPT | Mod: 25

## 2023-12-14 NOTE — HISTORY OF PRESENT ILLNESS
[Asymptomatic] : Associated Symptoms: the patient is currently asymptomatic [FreeTextEntry1] : 53 year old female with esophageal ring?/GERD, and atrial fibrillation / flutter, who is now s/p ablation of atypical AFL (7/17/2023) and presents today for follow-up. She has felt well since her ablation and has not had any repeat episodes of atrial fibrillation/flutter. She went to ophthalmologist, Dr. 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   Echo 3/2023- EF 60%. normal atria. mod MR. mild [place]/TR/AR 2022 normal exercise stress test.

## 2023-12-14 NOTE — ADDENDUM
[FreeTextEntry1] :  I, Laurita Bro, am scribing for and the presence of Dr. Good the following sections: HPI, PMH,Family/social history, ROS, Physical Exam, Assessment / Plan.  I, Nando Good, personally performed the services described in the documentation, reviewed the documentation recorded by the scribe in my presence and it accurately and completely records my words and actions.

## 2023-12-18 ENCOUNTER — LABORATORY RESULT (OUTPATIENT)
Age: 53
End: 2023-12-18

## 2023-12-18 ENCOUNTER — APPOINTMENT (OUTPATIENT)
Dept: NEUROLOGY | Facility: CLINIC | Age: 53
End: 2023-12-18
Payer: COMMERCIAL

## 2023-12-18 VITALS
SYSTOLIC BLOOD PRESSURE: 119 MMHG | TEMPERATURE: 98 F | WEIGHT: 189 LBS | DIASTOLIC BLOOD PRESSURE: 84 MMHG | OXYGEN SATURATION: 99 % | BODY MASS INDEX: 28.64 KG/M2 | HEART RATE: 87 BPM | HEIGHT: 68 IN

## 2023-12-18 DIAGNOSIS — G62.9 POLYNEUROPATHY, UNSPECIFIED: ICD-10-CM

## 2023-12-18 PROCEDURE — 99205 OFFICE O/P NEW HI 60 MIN: CPT

## 2023-12-18 PROCEDURE — 99215 OFFICE O/P EST HI 40 MIN: CPT

## 2023-12-18 NOTE — PHYSICAL EXAM
[FreeTextEntry1] : Alert. Fully oriented. Speech and language are intact. Cranial nerves II-XII are intact. Motor exam reveals intact strength with individual muscle testing in bilateral upper and lower extremities. Tone is normal. Sensation is intact to light touch in distal extremities. Finger-to-nose and heel-to-shin are intact. Rapid alternating movements are normal in the upper and lower extremities. Gait is normal.

## 2023-12-18 NOTE — HISTORY OF PRESENT ILLNESS
[FreeTextEntry1] : Oneida Morales is a 53-year-old female with PMH of HTN, Bell's palsy, gestational DM and A fib (on Eliquis) who presents initial consultation of stroke. Per Cardiology notes, patient was recently seen by Dr. Lopez (ophthalmologist) and was diagnosed with central retinal vein occlusion in her left eye on 12/12/23.  Per patient, she was diagnosed with A fib in June 2023 and had an ablation in July 2023. She reports no cardiac issues since then. She noted that on Sunday, that the vision in her L eye became blurry and she had difficulty reading. She was seen by her Ophthalmologist who diagnosed her with central retinal vein occlusion of her left eye. She was given two eyes drops at the appointment and her vision has continued to improve since then. She reports her vision being darker and the words appearing smaller than prior. BP today 119/84. She denies any recent lab work or head imaging. She endorses a family history of a TIA in her paternal grandfather, leg stents in her father and 3 miscarriages. No other known clotting disorders.  She also noted that she noticed burning in the bottom of her feet a year ago. She had an EMG completed on all of her extremities and was told that she has carpal tunnel in her hands. She notes that intermittent she has cringling in her feet, "like walking on sand" when. She was previously given Gabapentin, which worked in the past.

## 2023-12-18 NOTE — ASSESSMENT
[FreeTextEntry1] : Oneida Morales is a 53-year-old female with PMH of HTN and A fib (on Eliquis, s/p ablation) who presents initial consultation of stroke. Per Cardiology notes, patient was recently seen by Dr. Lopez (ophthalmologist) and was diagnosed with central retinal vein occlusion in her left eye on 12/12/23. Physical exam is reassuring and therefore additional lab work is needed to exclude any increased risk of clotting.   Plan: -Continue Eliquis for A fib; new prescription sent per patient request -Hypercoagulable work up to r/o increased clotting risk; consider Heme referral pending results -Continue monthly follow up with Dr. Lopez -Try Gabapentin 100 mg, 1-2 tablets at night for neuropathy symptoms -RTO in 2 months to review work up so far

## 2023-12-19 LAB
ALBUMIN SERPL ELPH-MCNC: 4.9 G/DL
ALP BLD-CCNC: 85 U/L
ALT SERPL-CCNC: 19 U/L
ANION GAP SERPL CALC-SCNC: 12 MMOL/L
AST SERPL-CCNC: 16 U/L
BASOPHILS # BLD AUTO: 0.06 K/UL
BASOPHILS NFR BLD AUTO: 0.7 %
BILIRUB SERPL-MCNC: 0.4 MG/DL
BUN SERPL-MCNC: 23 MG/DL
CALCIUM SERPL-MCNC: 10.2 MG/DL
CARDIOLIPIN AB SER IA-ACNC: POSITIVE
CHLORIDE SERPL-SCNC: 101 MMOL/L
CHOLEST SERPL-MCNC: 232 MG/DL
CO2 SERPL-SCNC: 26 MMOL/L
CREAT SERPL-MCNC: 0.88 MG/DL
EGFR: 79 ML/MIN/1.73M2
EOSINOPHIL # BLD AUTO: 0.03 K/UL
EOSINOPHIL NFR BLD AUTO: 0.4 %
FACT VIII ACT/NOR PPP: 115 %
FOLATE SERPL-MCNC: 6.3 NG/ML
GLUCOSE SERPL-MCNC: 101 MG/DL
HCT VFR BLD CALC: 43 %
HDLC SERPL-MCNC: 45 MG/DL
HGB BLD-MCNC: 14.3 G/DL
IMM GRANULOCYTES NFR BLD AUTO: 0.1 %
INR PPP: 1.12 RATIO
LDLC SERPL CALC-MCNC: 159 MG/DL
LYMPHOCYTES # BLD AUTO: 3.45 K/UL
LYMPHOCYTES NFR BLD AUTO: 40.7 %
MAN DIFF?: NORMAL
MCHC RBC-ENTMCNC: 28.9 PG
MCHC RBC-ENTMCNC: 33.3 GM/DL
MCV RBC AUTO: 86.9 FL
MONOCYTES # BLD AUTO: 0.42 K/UL
MONOCYTES NFR BLD AUTO: 5 %
NEUTROPHILS # BLD AUTO: 4.51 K/UL
NEUTROPHILS NFR BLD AUTO: 53.1 %
NONHDLC SERPL-MCNC: 187 MG/DL
PLATELET # BLD AUTO: 283 K/UL
PLATELET RESPONSE ASPIRIN: 613 ARU
POTASSIUM SERPL-SCNC: 3.9 MMOL/L
PROT SERPL-MCNC: 7.7 G/DL
PT BLD: 12.6 SEC
RBC # BLD: 4.95 M/UL
RBC # FLD: 13.2 %
SODIUM SERPL-SCNC: 139 MMOL/L
TRIGL SERPL-MCNC: 156 MG/DL
VIT B12 SERPL-MCNC: 502 PG/ML
WBC # FLD AUTO: 8.48 K/UL

## 2023-12-20 ENCOUNTER — NON-APPOINTMENT (OUTPATIENT)
Age: 53
End: 2023-12-20

## 2023-12-20 ENCOUNTER — APPOINTMENT (OUTPATIENT)
Dept: HEART AND VASCULAR | Facility: CLINIC | Age: 53
End: 2023-12-20

## 2023-12-20 LAB
B2 GLYCOPROT1 IGA SERPL IA-ACNC: <5 SAU
B2 GLYCOPROT1 IGG SER-ACNC: <5 SGU
B2 GLYCOPROT1 IGM SER-ACNC: 6.9 SMU
CARDIOLIPIN IGM SER-MCNC: 16.7 MPL
CARDIOLIPIN IGM SER-MCNC: <5 GPL
DEPRECATED CARDIOLIPIN IGA SER: 5.3 APL
PROT C PPP CHRO-ACNC: 151 %
PROT S AG ACT/NOR PPP IA: 106 %
PROT S FREE PPP-ACNC: 119 %

## 2023-12-21 LAB — APO LP(A) SERPL-MCNC: 29.4 NMOL/L

## 2023-12-22 LAB — DNA PLOIDY SPEC FC-IMP: NORMAL

## 2023-12-27 ENCOUNTER — NON-APPOINTMENT (OUTPATIENT)
Age: 53
End: 2023-12-27

## 2023-12-27 LAB
HOMOCYSTEINE LEVEL: 13.7 UMOL/L
METHYLMALONIC ACID LEVEL: 144 NMOL/L

## 2024-01-02 LAB
ALPHA LIPOPROTEINS: NORMAL
BETA LIPOPROTEINS: NORMAL
CHOLESTEROL, TOTAL: 223 MG/DL
LIPOPROTEIN CHYLOMICRONS: ABNORMAL
LIPOPROTEIN INTERPRETATION: NORMAL
PRE-BETA LIPOPROTEINS: NORMAL
SERUM APPEARANCE: CLEAR
TRIGLYCERIDES: 152 MG/DL

## 2024-03-13 ENCOUNTER — APPOINTMENT (OUTPATIENT)
Dept: HEMATOLOGY ONCOLOGY | Facility: CLINIC | Age: 54
End: 2024-03-13
Payer: COMMERCIAL

## 2024-03-13 VITALS
TEMPERATURE: 99.1 F | HEART RATE: 76 BPM | WEIGHT: 191 LBS | RESPIRATION RATE: 18 BRPM | OXYGEN SATURATION: 97 % | DIASTOLIC BLOOD PRESSURE: 87 MMHG | HEIGHT: 68 IN | BODY MASS INDEX: 28.95 KG/M2 | SYSTOLIC BLOOD PRESSURE: 128 MMHG

## 2024-03-13 PROCEDURE — 99203 OFFICE O/P NEW LOW 30 MIN: CPT

## 2024-03-18 NOTE — HISTORY OF PRESENT ILLNESS
[de-identified] : Oneida Morales is a 53 year old female referred by Dottie Caro for abnormal labs and hx of central retinal vein occlusion.  The patient has a history of atrial fibrillation s/p ablation in 7/2023.  She has remained on Eliquis since the ablation procedure.  In 12/2023 she developed sudden vision loss in the L eye and saw her ophthalmologist.  She was diagnosed with central retinal vein occlusion.  She was started on bevacizumab intra-ocular injections and has experienced significant improvement in her vision but is not 100% better.  She saw Neurology and an extensive lab workup for thrombophilia was collected.  Testing was normal for:  factor V leiden mutation, Protein S;  protein C activity was not deficient;  anticardiolipin igG and IgA were normal;  beta-2 glycoprotein screen was positive but the individual titers were normal;  lupus anticoagulant testing was negative;  CBC was unremarkable.  PTT was prolonged but the patient was taking eliquis when this test was drawn. Anticardiolipin IgM was mildly elevated at 16.  She admits that she was not 100% adherent to BID dosing of Eliquis prior to the 12/2023 event;  but since then has been taking faithfully.  she denies obstetric complications she has not had any thrombotic events besides the CRVO  up to date w/ colonoscopy (pt reports completed in 2023) - is overdue for breast cancer screening but states this is scheduled soon.  PMH, PSH reviewed and updated in the chart [facility] - negative for clots in the family SH - never smoker.  .  Works in an administrative position for a school.

## 2024-03-18 NOTE — REVIEW OF SYSTEMS
[Fever] : no fever [Night Sweats] : no night sweats [Chills] : no chills [Recent Change In Weight] : ~T no recent weight change [Fatigue] : fatigue [Eye Pain] : no eye pain [Red Eyes] : eyes not red [Vision Problems] : vision problems [Diarrhea: Grade 0] : Diarrhea: Grade 0 [Dizziness] : no dizziness [Confused] : no confusion [Fainting] : no fainting [Difficulty Walking] : no difficulty walking [Negative] : Endocrine [FreeTextEntry9] : + R hip pain [de-identified] : + intermittent burning pain in the soles of her feet.

## 2024-03-18 NOTE — ASSESSMENT
[FreeTextEntry1] : Oneida Morales is a 53 year old woman with a history of atrial fibrillation s/p ablation in 7/2023.  She has been on Eliquis since the diagnosis of atrial fibrillation.  She experienced sudden vision loss in the L eye in 12/2023 and was diagnosed with central retinal vein occlusion by her ophthalmologist.  Saw neurology;  laboratory workup for thrombophilia was negative (see discussion below).  Plan: 1.  CRVO --reviewed history with patient --reviewed labs ordered by Neurologist.  Overall the results of the thrombophilia lab exam were negative/reassuring.  the PTT was prolonged but this can be explained by use of eliquis;  lupus anticoagulant testing was negative.  Anticardiolipin and B2 glycoprotein screens were positive, but the individual antibody titers were negative.  Anticardiolipin IgM was slightly out of range at 16, but not significantly elevated - not meeting criteria for the diagnosis of antiphospholipid antibody syndrome. --encouraged the patient to get up to date w/ age appropriate cancer screening - she is overdue for breast cancer screening but has a mammogram scheduled.  Encouraged her to proceed w/ this. --No additional labs recommended. --Encouraged 100% adherence to BID dosing of eliquis --etiology of the CRVO is uncertain.  She does not have typical risk factors for this condition (such as diabetes, smoking, dyslipidemia, etc).  F/u with ophthalmology  All questions answered. RTC PRN.
[id number] Detailed

## 2024-04-03 ENCOUNTER — APPOINTMENT (OUTPATIENT)
Dept: HEART AND VASCULAR | Facility: CLINIC | Age: 54
End: 2024-04-03
Payer: COMMERCIAL

## 2024-04-03 VITALS
DIASTOLIC BLOOD PRESSURE: 80 MMHG | HEIGHT: 68 IN | BODY MASS INDEX: 28.34 KG/M2 | SYSTOLIC BLOOD PRESSURE: 130 MMHG | WEIGHT: 187 LBS

## 2024-04-03 DIAGNOSIS — I48.0 PAROXYSMAL ATRIAL FIBRILLATION: ICD-10-CM

## 2024-04-03 DIAGNOSIS — H34.8122 CENTRAL RETINAL VEIN OCCLUSION, LEFT EYE, STABLE: ICD-10-CM

## 2024-04-03 PROCEDURE — 93880 EXTRACRANIAL BILAT STUDY: CPT

## 2024-04-03 PROCEDURE — 93306 TTE W/DOPPLER COMPLETE: CPT

## 2024-04-03 PROCEDURE — 93000 ELECTROCARDIOGRAM COMPLETE: CPT | Mod: 59

## 2024-04-03 PROCEDURE — G2211 COMPLEX E/M VISIT ADD ON: CPT

## 2024-04-03 PROCEDURE — 99215 OFFICE O/P EST HI 40 MIN: CPT

## 2024-04-03 PROCEDURE — 93242 EXT ECG>48HR<7D RECORDING: CPT

## 2024-04-03 RX ORDER — APIXABAN 5 MG/1
5 TABLET, FILM COATED ORAL
Qty: 180 | Refills: 3 | Status: ACTIVE | COMMUNITY
Start: 2023-06-19

## 2024-04-03 RX ORDER — METOPROLOL SUCCINATE 50 MG/1
50 TABLET, EXTENDED RELEASE ORAL DAILY
Qty: 90 | Refills: 3 | Status: ACTIVE | COMMUNITY
Start: 2022-02-16 | End: 1900-01-01

## 2024-04-03 NOTE — DISCUSSION/SUMMARY
[Paroxysmal Atrial Fibrillation] : paroxysmal atrial fibrillation [Rate Control] : rate control [Deteriorating] : deteriorating [Rhythm Control] : rhythm control [Anticoagulation] : anticoagulation [Catheter Ablation Arrhythmogenic Foci] : catheter ablation of the arrhythmogenic foci [Patient] : the patient [FreeTextEntry1] : Patient is a 53-year-old white female with prior history of symptomatic A-fib which underwent ablation last July.  She was stable and while on Eliquis therapy had sudden vision loss in January 2024 thought to be related to a central vein occlusion.  At the time she was not in A-fib vision has been restored with treatment of a Avastin and follow-up with ophthalmologist.  Patient does report periodic elevations of her heart rates 110 120 with no jennifer symptoms of shortness of breath or chest pain.  Echocardiogram today reveals mild to moderate MR and mild to moderate aortic insufficiency with preserved LV function carotid duplex were negative for any plaque lesions.  Patient certainly may have had a thrombotic event unrelated to the A-fib as this was in the venous system and likely will need chronic anticoagulation she will wear a monitor for 5 days to see if she has any recurrent AF burden which might be treated with antiarrhythmic therapy and/or follow-up ablation.  Fasting lipids will be obtained as her prior lipid levels were somewhat elevated [EKG obtained to assist in diagnosis and management of assessed problem(s)] : EKG obtained to assist in diagnosis and management of assessed problem(s)

## 2024-04-03 NOTE — PHYSICAL EXAM
[Well Developed] : well developed [Well Nourished] : well nourished [No Acute Distress] : no acute distress [Normal Venous Pressure] : normal venous pressure [Normal Conjunctiva] : normal conjunctiva [No Carotid Bruit] : no carotid bruit [Normal S1, S2] : normal S1, S2 [No Murmur] : no murmur [No Rub] : no rub [No Gallop] : no gallop [No Respiratory Distress] : no respiratory distress  [Good Air Entry] : good air entry [Clear Lung Fields] : clear lung fields [Soft] : abdomen soft [Non Tender] : non-tender [No Masses/organomegaly] : no masses/organomegaly [Normal Gait] : normal gait [Normal Bowel Sounds] : normal bowel sounds [No Cyanosis] : no cyanosis [No Edema] : no edema [No Clubbing] : no clubbing [No Varicosities] : no varicosities [No Rash] : no rash [No Skin Lesions] : no skin lesions [Moves all extremities] : moves all extremities [No Focal Deficits] : no focal deficits [Normal Speech] : normal speech [Normal memory] : normal memory [Alert and Oriented] : alert and oriented

## 2024-04-03 NOTE — HISTORY OF PRESENT ILLNESS
Head,  normocephalic,  atraumatic,  Face,  Face within normal limits,  Ears,  External ears within normal limits,  Nose/Nasopharynx,  External nose  normal appearance,  nares patent,  no nasal discharge,  Mouth and Throat,  Oral cavity appearance normal,  Breath odor normal,  Lips,  Appearance normal
[FreeTextEntry1] : 09/12/22 One episode of Rapid  bpm  resolved after 1 hr  had no dizziness or chest pain   03/14/23 Denies Chest Pain, SOB, Dizziness, Leg edema, Orthopnea, PND, Palpitations, Syncope, FERRER, Diaphoresis.  06/19/23 has had recurrent visit for arrhythmias and noted to be in aflutter w rapid rates  Seen In ER today with AFLUTTER HR of 200 highly symptomatic with rapid HR   07/05/23 seen by EP for ablation  Hads one break through of AFIB asting 2 hrs  resolved w extra BB  planned for ABLATION on 7/17/23 9/20/23 s/p ablation of afib  BB droped due to fatique   4/2/24 Had central vein occlusion 1/24 left eye vison loss  had central vein occlusion  vision was restored in 24 hours and was rx w Avastin  mild distortion in left eye  s/p ablation fpr PAF  feels HR at times > 110 no sob

## 2024-04-12 PROCEDURE — 93244 EXT ECG>48HR<7D REV&INTERPJ: CPT

## 2024-06-03 RX ORDER — GABAPENTIN 100 MG/1
100 CAPSULE ORAL
Qty: 180 | Refills: 1 | Status: ACTIVE | COMMUNITY
Start: 2023-12-18 | End: 1900-01-01

## 2024-08-29 ENCOUNTER — RX RENEWAL (OUTPATIENT)
Age: 54
End: 2024-08-29

## 2024-08-29 RX ORDER — METOPROLOL SUCCINATE 25 MG/1
25 TABLET, EXTENDED RELEASE ORAL
Qty: 90 | Refills: 3 | Status: ACTIVE | COMMUNITY
Start: 2024-08-29 | End: 1900-01-01

## 2024-10-30 ENCOUNTER — APPOINTMENT (OUTPATIENT)
Dept: HEART AND VASCULAR | Facility: CLINIC | Age: 54
End: 2024-10-30
Payer: COMMERCIAL

## 2024-10-30 ENCOUNTER — NON-APPOINTMENT (OUTPATIENT)
Age: 54
End: 2024-10-30

## 2024-10-30 VITALS
WEIGHT: 185 LBS | HEART RATE: 69 BPM | DIASTOLIC BLOOD PRESSURE: 90 MMHG | SYSTOLIC BLOOD PRESSURE: 132 MMHG | BODY MASS INDEX: 28.04 KG/M2 | HEIGHT: 68 IN

## 2024-10-30 DIAGNOSIS — Z00.00 ENCOUNTER FOR GENERAL ADULT MEDICAL EXAMINATION W/OUT ABNORMAL FINDINGS: ICD-10-CM

## 2024-10-30 DIAGNOSIS — I48.0 PAROXYSMAL ATRIAL FIBRILLATION: ICD-10-CM

## 2024-10-30 PROCEDURE — 93000 ELECTROCARDIOGRAM COMPLETE: CPT

## 2024-10-30 PROCEDURE — G2211 COMPLEX E/M VISIT ADD ON: CPT | Mod: NC

## 2024-10-30 PROCEDURE — 99214 OFFICE O/P EST MOD 30 MIN: CPT

## 2024-10-30 RX ORDER — RIVAROXABAN 20 MG/1
20 TABLET, FILM COATED ORAL
Qty: 90 | Refills: 3 | Status: ACTIVE | COMMUNITY
Start: 2024-10-30 | End: 1900-01-01

## 2025-03-05 ENCOUNTER — NON-APPOINTMENT (OUTPATIENT)
Age: 55
End: 2025-03-05

## 2025-03-05 ENCOUNTER — APPOINTMENT (OUTPATIENT)
Dept: HEART AND VASCULAR | Facility: CLINIC | Age: 55
End: 2025-03-05
Payer: COMMERCIAL

## 2025-03-05 VITALS
BODY MASS INDEX: 27.58 KG/M2 | SYSTOLIC BLOOD PRESSURE: 110 MMHG | WEIGHT: 182 LBS | HEART RATE: 72 BPM | DIASTOLIC BLOOD PRESSURE: 90 MMHG | HEIGHT: 68 IN

## 2025-03-05 DIAGNOSIS — I48.0 PAROXYSMAL ATRIAL FIBRILLATION: ICD-10-CM

## 2025-03-05 PROCEDURE — G2211 COMPLEX E/M VISIT ADD ON: CPT | Mod: NC

## 2025-03-05 PROCEDURE — 93000 ELECTROCARDIOGRAM COMPLETE: CPT

## 2025-03-05 PROCEDURE — 99214 OFFICE O/P EST MOD 30 MIN: CPT
